# Patient Record
Sex: FEMALE | Race: BLACK OR AFRICAN AMERICAN | URBAN - METROPOLITAN AREA
[De-identification: names, ages, dates, MRNs, and addresses within clinical notes are randomized per-mention and may not be internally consistent; named-entity substitution may affect disease eponyms.]

---

## 2021-03-09 ENCOUNTER — OFFICE VISIT (OUTPATIENT)
Dept: URBAN - METROPOLITAN AREA CLINIC 17 | Facility: CLINIC | Age: 71
End: 2021-03-09
Payer: MEDICARE

## 2021-03-09 DIAGNOSIS — I10 ESSENTIAL HYPERTENSION: ICD-10-CM

## 2021-03-09 DIAGNOSIS — R63.6 UNDERWEIGHT: ICD-10-CM

## 2021-03-09 DIAGNOSIS — K63.5 HYPERPLASTIC COLONIC POLYP, UNSPECIFIED PART OF COLON: ICD-10-CM

## 2021-03-09 DIAGNOSIS — D50.0 IRON DEFICIENCY ANEMIA DUE TO CHRONIC BLOOD LOSS: ICD-10-CM

## 2021-03-09 DIAGNOSIS — E13.9 DIABETES 1.5, MANAGED AS TYPE 2: ICD-10-CM

## 2021-03-09 DIAGNOSIS — N28.9 KIDNEY LESION: ICD-10-CM

## 2021-03-09 DIAGNOSIS — C17.9 ADENOCARCINOMA OF SMALL BOWEL: ICD-10-CM

## 2021-03-09 DIAGNOSIS — B37.89 PERIANAL CANDIDIASIS: ICD-10-CM

## 2021-03-09 PROBLEM — 424440001: Status: ACTIVE | Noted: 2021-03-09

## 2021-03-09 PROBLEM — 426875007: Status: ACTIVE | Noted: 2021-03-09

## 2021-03-09 PROBLEM — 59621000: Status: ACTIVE | Noted: 2021-03-09

## 2021-03-09 PROBLEM — 724556004: Status: ACTIVE | Noted: 2021-03-09

## 2021-03-09 PROCEDURE — 99214 OFFICE O/P EST MOD 30 MIN: CPT | Performed by: INTERNAL MEDICINE

## 2021-03-09 RX ORDER — ATORVASTATIN CALCIUM 40 MG/1
TABLET, FILM COATED ORAL
Qty: 0 | Refills: 0 | Status: ACTIVE | COMMUNITY
Start: 1900-01-01

## 2021-03-09 NOTE — EXAM-PHYSICAL EXAM
Gen: Alert, oriented, in no distress Heent: no icterus, lips wnl Lungs: bilateral breath sounds CVS: first and second heart sounds Abdomen: full, soft, non tender, midline scar with dependant skin Ext: no edema Joints: normal joints and symmetry Spine: consistent with age Skin: no rash on exposed areas Neuro: no focal localizing signs Perianal exam with whitish nummular lesions most c/w candida

## 2021-03-09 NOTE — HPI-OTHER HISTORIES
67 yo lady pt of Stephanie Toledo. She recently had blood work at [facility] on 8/23. It showed Hb 9.8, platelets 937, MCV 88. She says she saw PCP as she has had intermittent abdominal pain. periumbilical . resolving with Tylenol. She then saw DR Lawler with results of her lab work. She did not follow up with me after c-scope as instructed. On 9/14 she then developed severe lower abdominal pain and went to the ER. no vomiting. CT results noted below. Hb was 8.2 MCV 76. She has not lost weight. She eats and has no pain except for nuts and raw veggies which she avoids. WE discussed CT ; possibility of malignancy, liver findings, kidney lesions. Her brother was just diaganosed with bladder. She says "Im not emptying my bladder as I should". She just restarted iron pills and her stools have been black since then . She only has 3 hydrocodone pills from the ER but it controls her pain.  10/9/18 She is feeling better. She has seen Dr Bauer and Dr Lawler. She has also seen Dr Lio Farias for a second opinion. She has a SBFT which showed no obstruction and no fistula with the sigmoid. She has not needed to take a lot of tramadol and still has several pills left. She gets nauseous occasionally and takes zofran for that. she needs some refills. She is taking iron pills. stools are dark. She says at [place] she was told her hb was up to 10. She had a liver bx yesterday. She saw urology Dr Galvan at [facility]. She has had an MRI and we discussed results. She has been having regular BMs. She is seeing a cardiologist at Dr Lawler recommendations for chest pain she had about 6-8 weeks ago lasting 3 hours. 11/20/18 no further chest pain. Had had chemo x 2. No s/e at all she says. She says she is relying on her samanta. She has rescheduled her stress test.She has not had take tramadol. No longer on iron. Got an iron transfusion after first chemo. she has a left chest port.  1/3/19 I spoke to Danilo Lawler and Lizette and have agreed pt should have pan endoscopy to clear her upper and lower GI tracts. She has no complaints except for abd pain when she eats raw veggies and nuts so she avoids these. Otherwise she has had no abd pain and has not taken pain meds in months. She is in excellent spirits, has regular BMs daily. NO nausea or vomiting. Currently her last chemo is for 6 days from now. Reviewed CT reports.  2/7/19 She is doing well no complaints. Spoke to DR Bauer who wanted her to have a pillcam prior to surgery - she will have a segmental resection of her liver and he would run her SB to try and localize the primary lesion. WE discussed risk of pillcam retention. I had her do a SBFT to evaluate SB. it is negative 2/6/19 Will see if her insurance approves it before surgery date and we will do all we can to make this happen. 5/14/19 She is doing well. I reviewed her op notes from 2/2019. She had to be admitted for post op seroma. She had a CT last week which we reviewed - no evidence of metastatic disease. No complaints except for an occasional ache around her scars. She was 85 lbs on discharge and is up to 91 lbs now. She has a BM daily and is walking 3 times a week for 45 mins. 8/15/19 Doing well. no abdominal symptoms. reviewed CT from 8/51/19 .CBC from 8/7/19 is wnl hb 12.2.  1/16/20 doing great. Says she is off a lot of meds reviewed CT from 10/2019 she has seen urology. repeat CT is next week. Has regular BMs. gained 6 lbs.  3/9/21 doing well. has gained 10 lbs. celebrated 35 yrs wedding anniversary yesterday. REviewed CT 1/2021. She has seen urology for renal lesion "they just want to watch it". Has regular BMs. no abdominal pain. Has some rectal itching for the past 6 weeks. no constipation. Spent over 40 mins in care, reviewing records. counseling

## 2021-03-10 ENCOUNTER — TELEPHONE ENCOUNTER (OUTPATIENT)
Dept: URBAN - METROPOLITAN AREA CLINIC 92 | Facility: CLINIC | Age: 71
End: 2021-03-10

## 2021-03-10 RX ORDER — KETOCONAZOLE 20 MG/G
1 APPLICATION CREAM TOPICAL ONCE A DAY
Qty: 1 | Refills: 0 | OUTPATIENT
Start: 2021-03-10 | End: 2021-03-24

## 2024-04-08 ENCOUNTER — LAB (OUTPATIENT)
Dept: URBAN - METROPOLITAN AREA CLINIC 17 | Facility: CLINIC | Age: 74
End: 2024-04-08

## 2024-04-08 ENCOUNTER — OV EP (OUTPATIENT)
Dept: URBAN - METROPOLITAN AREA CLINIC 17 | Facility: CLINIC | Age: 74
End: 2024-04-08

## 2024-04-08 ENCOUNTER — OV NP (OUTPATIENT)
Dept: URBAN - METROPOLITAN AREA CLINIC 17 | Facility: CLINIC | Age: 74
End: 2024-04-08
Payer: MEDICARE

## 2024-04-08 VITALS
WEIGHT: 103.6 LBS | DIASTOLIC BLOOD PRESSURE: 79 MMHG | BODY MASS INDEX: 20.88 KG/M2 | SYSTOLIC BLOOD PRESSURE: 133 MMHG | HEIGHT: 59 IN | HEART RATE: 83 BPM | TEMPERATURE: 97.8 F

## 2024-04-08 DIAGNOSIS — K63.5 HYPERPLASTIC COLONIC POLYP, UNSPECIFIED PART OF COLON: ICD-10-CM

## 2024-04-08 DIAGNOSIS — R63.6 UNDERWEIGHT: ICD-10-CM

## 2024-04-08 DIAGNOSIS — C17.9 ADENOCARCINOMA OF SMALL BOWEL: ICD-10-CM

## 2024-04-08 DIAGNOSIS — I10 ESSENTIAL HYPERTENSION: ICD-10-CM

## 2024-04-08 DIAGNOSIS — D50.0 IRON DEFICIENCY ANEMIA DUE TO CHRONIC BLOOD LOSS: ICD-10-CM

## 2024-04-08 DIAGNOSIS — B37.89 PERIANAL CANDIDIASIS: ICD-10-CM

## 2024-04-08 DIAGNOSIS — E13.9 DIABETES 1.5, MANAGED AS TYPE 2: ICD-10-CM

## 2024-04-08 DIAGNOSIS — N28.9 KIDNEY LESION: ICD-10-CM

## 2024-04-08 PROCEDURE — 99213 OFFICE O/P EST LOW 20 MIN: CPT | Performed by: INTERNAL MEDICINE

## 2024-04-08 RX ORDER — MULTIVIT-MIN/IRON/FOLIC ACID/K 18-600-40
AS DIRECTED CAPSULE ORAL
Status: ACTIVE | COMMUNITY

## 2024-04-08 RX ORDER — B-COMPLEX WITH VITAMIN C
1 TABLET TABLET ORAL ONCE A DAY
Status: ACTIVE | COMMUNITY

## 2024-04-08 RX ORDER — ATORVASTATIN CALCIUM 40 MG/1
TABLET, FILM COATED ORAL
Qty: 0 | Refills: 0 | Status: ON HOLD | COMMUNITY
Start: 1900-01-01

## 2024-04-08 RX ORDER — POLYETHYLENE GLYCOL-3350 AND ELECTROLYTES WITH FLAVOR PACK 240; 5.84; 2.98; 6.72; 22.72 G/278.26G; G/278.26G; G/278.26G; G/278.26G; G/278.26G
AS DIRECTED POWDER, FOR SOLUTION ORAL 1
Qty: 1 | Refills: 0 | OUTPATIENT
Start: 2024-04-08 | End: 2024-04-09

## 2024-04-08 RX ORDER — ATORVASTATIN CALCIUM 40 MG/1
TABLET, FILM COATED ORAL
Qty: 0 | Refills: 0 | COMMUNITY
Start: 1900-01-01

## 2024-04-08 NOTE — HPI-OTHER HISTORIES
67 yo lady pt of Stephanie Toledo. She recently had blood work at [facility] on 8/23. It showed Hb 9.8, platelets 937, MCV 88. She says she saw PCP as she has had intermittent abdominal pain. periumbilical . resolving with Tylenol. She then saw DR Lawler with results of her lab work. She did not follow up with me after c-scope as instructed. On 9/14 she then developed severe lower abdominal pain and went to the ER. no vomiting. CT results noted below. Hb was 8.2 MCV 76. She has not lost weight. She eats and has no pain except for nuts and raw veggies which she avoids. WE discussed CT ; possibility of malignancy, liver findings, kidney lesions. Her brother was just diaganosed with bladder. She says "Im not emptying my bladder as I should". She just restarted iron pills and her stools have been black since then . She only has 3 hydrocodone pills from the ER but it controls her pain.  10/9/18 She is feeling better. She has seen Dr Bauer and Dr Lawler. She has also seen Dr Lio Farias for a second opinion. She has a SBFT which showed no obstruction and no fistula with the sigmoid. She has not needed to take a lot of tramadol and still has several pills left. She gets nauseous occasionally and takes zofran for that. she needs some refills. She is taking iron pills. stools are dark. She says at [place] she was told her hb was up to 10. She had a liver bx yesterday. She saw urology Dr Galvan at [facility]. She has had an MRI and we discussed results. She has been having regular BMs. She is seeing a cardiologist at Dr Lawler recommendations for chest pain she had about 6-8 weeks ago lasting 3 hours. 11/20/18 no further chest pain. Had had chemo x 2. No s/e at all she says. She says she is relying on her samanta. She has rescheduled her stress test.She has not had take tramadol. No longer on iron. Got an iron transfusion after first chemo. she has a left chest port.  1/3/19 I spoke to Danilo Lawler and Lizette and have agreed pt should have pan endoscopy to clear her upper and lower GI tracts. She has no complaints except for abd pain when she eats raw veggies and nuts so she avoids these. Otherwise she has had no abd pain and has not taken pain meds in months. She is in excellent spirits, has regular BMs daily. NO nausea or vomiting. Currently her last chemo is for 6 days from now. Reviewed CT reports.  2/7/19 She is doing well no complaints. Spoke to DR Bauer who wanted her to have a pillcam prior to surgery - she will have a segmental resection of her liver and he would run her SB to try and localize the primary lesion. WE discussed risk of pillcam retention. I had her do a SBFT to evaluate SB. it is negative 2/6/19 Will see if her insurance approves it before surgery date and we will do all we can to make this happen. 5/14/19 She is doing well. I reviewed her op notes from 2/2019. She had to be admitted for post op seroma. She had a CT last week which we reviewed - no evidence of metastatic disease. No complaints except for an occasional ache around her scars. She was 85 lbs on discharge and is up to 91 lbs now. She has a BM daily and is walking 3 times a week for 45 mins. 8/15/19 Doing well. no abdominal symptoms. reviewed CT from 8/51/19 .CBC from 8/7/19 is wnl hb 12.2.  1/16/20 doing great. Says she is off a lot of meds reviewed CT from 10/2019 she has seen urology. repeat CT is next week. Has regular BMs. gained 6 lbs.  3/9/21 doing well. has gained 10 lbs. celebrated 35 yrs wedding anniversary yesterday. REviewed CT 1/2021. She has seen urology for renal lesion "they just want to watch it". Has regular BMs. no abdominal pain. Has some rectal itching for the past 6 weeks. no constipation. Spent over 40 mins in care, reviewing records. counseling  4/8/24 Here for colonoscopy Says she is cancer free "dr Lawler says I am a walking miracle" Has regular BMs no blood in stool.

## 2024-06-05 ENCOUNTER — OUT OF OFFICE VISIT (OUTPATIENT)
Dept: URBAN - METROPOLITAN AREA SURGERY CENTER 13 | Facility: SURGERY CENTER | Age: 74
End: 2024-06-05
Payer: MEDICARE

## 2024-06-05 ENCOUNTER — CLAIMS CREATED FROM THE CLAIM WINDOW (OUTPATIENT)
Dept: URBAN - METROPOLITAN AREA CLINIC 4 | Facility: CLINIC | Age: 74
End: 2024-06-05
Payer: MEDICARE

## 2024-06-05 DIAGNOSIS — K63.5 BENIGN COLON POLYP: ICD-10-CM

## 2024-06-05 DIAGNOSIS — Z86.010 ADENOMAS PERSONAL HISTORY OF COLONIC POLYPS: ICD-10-CM

## 2024-06-05 DIAGNOSIS — K63.89 MELANOSIS OF COLON: ICD-10-CM

## 2024-06-05 DIAGNOSIS — Z09 ENCOUNTER FOR COLONOSCOPY FOLLOWING COLON POLYP REMOVAL: ICD-10-CM

## 2024-06-05 DIAGNOSIS — Z98.0 INTESTINAL BYPASS AND ANASTOMOSIS STATUS: ICD-10-CM

## 2024-06-05 DIAGNOSIS — D12.5 BENIGN NEOPLASM OF SIGMOID COLON: ICD-10-CM

## 2024-06-05 DIAGNOSIS — Z86.010 PERSONAL HISTORY OF COLONIC POLYPS: ICD-10-CM

## 2024-06-05 DIAGNOSIS — K64.8 OTHER HEMORRHOIDS: ICD-10-CM

## 2024-06-05 DIAGNOSIS — Z86.010 PERSONAL HISTORY OF COLON POLYPS: ICD-10-CM

## 2024-06-05 DIAGNOSIS — K63.89 OTHER SPECIFIED DISEASES OF INTESTINE: ICD-10-CM

## 2024-06-05 DIAGNOSIS — D12.5 ADENOMA OF SIGMOID COLON: ICD-10-CM

## 2024-06-05 DIAGNOSIS — K63.5 POLYP OF SIGMOID COLON, UNSPECIFIED TYPE: ICD-10-CM

## 2024-06-05 DIAGNOSIS — K63.5 POLYP OF COLON: ICD-10-CM

## 2024-06-05 PROCEDURE — 00812 ANES LWR INTST SCR COLSC: CPT | Performed by: ANESTHESIOLOGIST ASSISTANT

## 2024-06-05 PROCEDURE — 45380 COLONOSCOPY AND BIOPSY: CPT | Performed by: INTERNAL MEDICINE

## 2024-06-05 PROCEDURE — G8907 PT DOC NO EVENTS ON DISCHARG: HCPCS | Performed by: CLINIC/CENTER

## 2024-06-05 PROCEDURE — 45380 COLONOSCOPY AND BIOPSY: CPT | Performed by: CLINIC/CENTER

## 2024-06-05 PROCEDURE — 88305 TISSUE EXAM BY PATHOLOGIST: CPT | Performed by: PATHOLOGY

## 2024-06-05 PROCEDURE — 00812 ANES LWR INTST SCR COLSC: CPT | Performed by: ANESTHESIOLOGY

## 2024-06-05 RX ORDER — ATORVASTATIN CALCIUM 40 MG/1
TABLET, FILM COATED ORAL
Qty: 0 | Refills: 0 | Status: ON HOLD | COMMUNITY
Start: 1900-01-01

## 2024-06-05 RX ORDER — MULTIVIT-MIN/IRON/FOLIC ACID/K 18-600-40
AS DIRECTED CAPSULE ORAL
Status: ACTIVE | COMMUNITY

## 2024-06-05 RX ORDER — B-COMPLEX WITH VITAMIN C
1 TABLET TABLET ORAL ONCE A DAY
Status: ACTIVE | COMMUNITY

## 2024-08-12 ENCOUNTER — OFFICE VISIT (OUTPATIENT)
Dept: URBAN - METROPOLITAN AREA CLINIC 17 | Facility: CLINIC | Age: 74
End: 2024-08-12
Payer: MEDICARE

## 2024-08-12 ENCOUNTER — DASHBOARD ENCOUNTERS (OUTPATIENT)
Age: 74
End: 2024-08-12

## 2024-08-12 VITALS
HEIGHT: 59 IN | BODY MASS INDEX: 20.52 KG/M2 | WEIGHT: 101.8 LBS | TEMPERATURE: 97.7 F | HEART RATE: 75 BPM | SYSTOLIC BLOOD PRESSURE: 135 MMHG | DIASTOLIC BLOOD PRESSURE: 75 MMHG

## 2024-08-12 DIAGNOSIS — I10 ESSENTIAL HYPERTENSION: ICD-10-CM

## 2024-08-12 DIAGNOSIS — E13.9 DIABETES 1.5, MANAGED AS TYPE 2: ICD-10-CM

## 2024-08-12 DIAGNOSIS — D50.0 IRON DEFICIENCY ANEMIA DUE TO CHRONIC BLOOD LOSS: ICD-10-CM

## 2024-08-12 DIAGNOSIS — K63.5 HYPERPLASTIC COLONIC POLYP, UNSPECIFIED PART OF COLON: ICD-10-CM

## 2024-08-12 PROCEDURE — 99213 OFFICE O/P EST LOW 20 MIN: CPT | Performed by: INTERNAL MEDICINE

## 2024-08-12 RX ORDER — B-COMPLEX WITH VITAMIN C
1 TABLET TABLET ORAL ONCE A DAY
Status: ACTIVE | COMMUNITY

## 2024-08-12 RX ORDER — MULTIVIT-MIN/IRON/FOLIC ACID/K 18-600-40
AS DIRECTED CAPSULE ORAL
Status: ACTIVE | COMMUNITY

## 2024-08-12 NOTE — HPI-OTHER HISTORIES
65 yo lady pt of Stephanie Toledo. She recently had blood work at [facility] on 8/23. It showed Hb 9.8, platelets 937, MCV 88. She says she saw PCP as she has had intermittent abdominal pain. periumbilical . resolving with Tylenol. She then saw DR Lawler with results of her lab work. She did not follow up with me after c-scope as instructed. On 9/14 she then developed severe lower abdominal pain and went to the ER. no vomiting. CT results noted below. Hb was 8.2 MCV 76. She has not lost weight. She eats and has no pain except for nuts and raw veggies which she avoids. WE discussed CT ; possibility of malignancy, liver findings, kidney lesions. Her brother was just diaganosed with bladder. She says "Im not emptying my bladder as I should". She just restarted iron pills and her stools have been black since then . She only has 3 hydrocodone pills from the ER but it controls her pain.  10/9/18 She is feeling better. She has seen Dr Bauer and Dr Lawler. She has also seen Dr Lio Farias for a second opinion. She has a SBFT which showed no obstruction and no fistula with the sigmoid. She has not needed to take a lot of tramadol and still has several pills left. She gets nauseous occasionally and takes zofran for that. she needs some refills. She is taking iron pills. stools are dark. She says at [place] she was told her hb was up to 10. She had a liver bx yesterday. She saw urology Dr Galvan at [facility]. She has had an MRI and we discussed results. She has been having regular BMs. She is seeing a cardiologist at Dr Lawler recommendations for chest pain she had about 6-8 weeks ago lasting 3 hours. 11/20/18 no further chest pain. Had had chemo x 2. No s/e at all she says. She says she is relying on her samanta. She has rescheduled her stress test.She has not had take tramadol. No longer on iron. Got an iron transfusion after first chemo. she has a left chest port.  1/3/19 I spoke to Danilo Lawler and Lizette and have agreed pt should have pan endoscopy to clear her upper and lower GI tracts. She has no complaints except for abd pain when she eats raw veggies and nuts so she avoids these. Otherwise she has had no abd pain and has not taken pain meds in months. She is in excellent spirits, has regular BMs daily. NO nausea or vomiting. Currently her last chemo is for 6 days from now. Reviewed CT reports.  2/7/19 She is doing well no complaints. Spoke to DR Bauer who wanted her to have a pillcam prior to surgery - she will have a segmental resection of her liver and he would run her SB to try and localize the primary lesion. WE discussed risk of pillcam retention. I had her do a SBFT to evaluate SB. it is negative 2/6/19 Will see if her insurance approves it before surgery date and we will do all we can to make this happen. 5/14/19 She is doing well. I reviewed her op notes from 2/2019. She had to be admitted for post op seroma. She had a CT last week which we reviewed - no evidence of metastatic disease. No complaints except for an occasional ache around her scars. She was 85 lbs on discharge and is up to 91 lbs now. She has a BM daily and is walking 3 times a week for 45 mins. 8/15/19 Doing well. no abdominal symptoms. reviewed CT from 8/51/19 .CBC from 8/7/19 is wnl hb 12.2.  1/16/20 doing great. Says she is off a lot of meds reviewed CT from 10/2019 she has seen urology. repeat CT is next week. Has regular BMs. gained 6 lbs.  3/9/21 doing well. has gained 10 lbs. celebrated 35 yrs wedding anniversary yesterday. REviewed CT 1/2021. She has seen urology for renal lesion "they just want to watch it". Has regular BMs. no abdominal pain. Has some rectal itching for the past 6 weeks. no constipation. Spent over 40 mins in care, reviewing records. counseling  4/8/24 Here for colonoscopy Says she is cancer free "dr Lawler says I am a walking miracle" Has regular BMs no blood in stool.  8/12/24 Here for DAB Here with  says only some anal itching. no bleeding. not constipated Discussed colonsocopy and bx resutls.  Answered questions.

## 2024-10-04 ENCOUNTER — LAB OUTSIDE AN ENCOUNTER (OUTPATIENT)
Dept: URBAN - METROPOLITAN AREA CLINIC 105 | Facility: CLINIC | Age: 74
End: 2024-10-04

## 2024-10-04 ENCOUNTER — OFFICE VISIT (OUTPATIENT)
Dept: URBAN - METROPOLITAN AREA CLINIC 105 | Facility: CLINIC | Age: 74
End: 2024-10-04
Payer: MEDICARE

## 2024-10-04 VITALS
SYSTOLIC BLOOD PRESSURE: 128 MMHG | HEART RATE: 66 BPM | WEIGHT: 100.4 LBS | BODY MASS INDEX: 20.24 KG/M2 | HEIGHT: 59 IN | DIASTOLIC BLOOD PRESSURE: 77 MMHG | TEMPERATURE: 97.2 F

## 2024-10-04 DIAGNOSIS — D50.0 IRON DEFICIENCY ANEMIA DUE TO CHRONIC BLOOD LOSS: ICD-10-CM

## 2024-10-04 DIAGNOSIS — K64.9 HEMORRHOIDS, UNSPECIFIED HEMORRHOID TYPE: ICD-10-CM

## 2024-10-04 DIAGNOSIS — N28.9 KIDNEY LESION: ICD-10-CM

## 2024-10-04 DIAGNOSIS — K86.2 PANCREATIC CYST: ICD-10-CM

## 2024-10-04 DIAGNOSIS — E13.9 DIABETES 1.5, MANAGED AS TYPE 2: ICD-10-CM

## 2024-10-04 DIAGNOSIS — I10 ESSENTIAL HYPERTENSION: ICD-10-CM

## 2024-10-04 DIAGNOSIS — K63.5 HYPERPLASTIC COLONIC POLYP, UNSPECIFIED PART OF COLON: ICD-10-CM

## 2024-10-04 DIAGNOSIS — D12.6 SERRATED ADENOMA OF COLON: ICD-10-CM

## 2024-10-04 DIAGNOSIS — B37.89 PERIANAL CANDIDIASIS: ICD-10-CM

## 2024-10-04 DIAGNOSIS — C17.9 ADENOCARCINOMA OF SMALL BOWEL: ICD-10-CM

## 2024-10-04 DIAGNOSIS — Z86.0102 HISTORY OF HYPERPLASTIC POLYP OF COLON: ICD-10-CM

## 2024-10-04 DIAGNOSIS — R63.6 UNDERWEIGHT: ICD-10-CM

## 2024-10-04 PROCEDURE — 99214 OFFICE O/P EST MOD 30 MIN: CPT | Performed by: INTERNAL MEDICINE

## 2024-10-04 RX ORDER — MULTIVIT-MIN/IRON/FOLIC ACID/K 18-600-40
AS DIRECTED CAPSULE ORAL
Status: ACTIVE | COMMUNITY

## 2024-10-04 RX ORDER — B-COMPLEX WITH VITAMIN C
1 TABLET TABLET ORAL ONCE A DAY
Status: ACTIVE | COMMUNITY

## 2024-10-04 NOTE — HPI-OTHER HISTORIES
67 yo lady pt of Stephanie Toledo. She recently had blood work at [facility] on 8/23. It showed Hb 9.8, platelets 937, MCV 88. She says she saw PCP as she has had intermittent abdominal pain. periumbilical . resolving with Tylenol. She then saw DR Lawler with results of her lab work. She did not follow up with me after c-scope as instructed. On 9/14 she then developed severe lower abdominal pain and went to the ER. no vomiting. CT results noted below. Hb was 8.2 MCV 76. She has not lost weight. She eats and has no pain except for nuts and raw veggies which she avoids. WE discussed CT ; possibility of malignancy, liver findings, kidney lesions. Her brother was just diaganosed with bladder. She says "Im not emptying my bladder as I should". She just restarted iron pills and her stools have been black since then . She only has 3 hydrocodone pills from the ER but it controls her pain.  10/9/18 She is feeling better. She has seen Dr Bauer and Dr Lawler. She has also seen Dr Lio Farias for a second opinion. She has a SBFT which showed no obstruction and no fistula with the sigmoid. She has not needed to take a lot of tramadol and still has several pills left. She gets nauseous occasionally and takes zofran for that. she needs some refills. She is taking iron pills. stools are dark. She says at [place] she was told her hb was up to 10. She had a liver bx yesterday. She saw urology Dr Galvan at [facility]. She has had an MRI and we discussed results. She has been having regular BMs. She is seeing a cardiologist at Dr Lawler recommendations for chest pain she had about 6-8 weeks ago lasting 3 hours. 11/20/18 no further chest pain. Had had chemo x 2. No s/e at all she says. She says she is relying on her samanta. She has rescheduled her stress test.She has not had take tramadol. No longer on iron. Got an iron transfusion after first chemo. she has a left chest port.  1/3/19 I spoke to Danilo Lawler and Lizette and have agreed pt should have pan endoscopy to clear her upper and lower GI tracts. She has no complaints except for abd pain when she eats raw veggies and nuts so she avoids these. Otherwise she has had no abd pain and has not taken pain meds in months. She is in excellent spirits, has regular BMs daily. NO nausea or vomiting. Currently her last chemo is for 6 days from now. Reviewed CT reports.  2/7/19 She is doing well no complaints. Spoke to DR Bauer who wanted her to have a pillcam prior to surgery - she will have a segmental resection of her liver and he would run her SB to try and localize the primary lesion. WE discussed risk of pillcam retention. I had her do a SBFT to evaluate SB. it is negative 2/6/19 Will see if her insurance approves it before surgery date and we will do all we can to make this happen. 5/14/19 She is doing well. I reviewed her op notes from 2/2019. She had to be admitted for post op seroma. She had a CT last week which we reviewed - no evidence of metastatic disease. No complaints except for an occasional ache around her scars. She was 85 lbs on discharge and is up to 91 lbs now. She has a BM daily and is walking 3 times a week for 45 mins. 8/15/19 Doing well. no abdominal symptoms. reviewed CT from 8/51/19 .CBC from 8/7/19 is wnl hb 12.2.  1/16/20 doing great. Says she is off a lot of meds reviewed CT from 10/2019 she has seen urology. repeat CT is next week. Has regular BMs. gained 6 lbs.  3/9/21 doing well. has gained 10 lbs. celebrated 35 yrs wedding anniversary yesterday. REviewed CT 1/2021. She has seen urology for renal lesion "they just want to watch it". Has regular BMs. no abdominal pain. Has some rectal itching for the past 6 weeks. no constipation. Spent over 40 mins in care, reviewing records. counseling  4/8/24 Here for colonoscopy Says she is cancer free "dr Lawler says I am a walking miracle" Has regular BMs no blood in stool.  8/12/24 Here for DAB Here with  says only some anal itching. no bleeding. not constipated Discussed colonsocopy and bx resutls.  Answered questions.  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  some stable pulm nodules.

## 2024-10-10 ENCOUNTER — TELEPHONE ENCOUNTER (OUTPATIENT)
Dept: URBAN - METROPOLITAN AREA CLINIC 3 | Facility: CLINIC | Age: 74
End: 2024-10-10

## 2024-10-22 ENCOUNTER — TELEPHONE ENCOUNTER (OUTPATIENT)
Dept: URBAN - METROPOLITAN AREA CLINIC 105 | Facility: CLINIC | Age: 74
End: 2024-10-22

## 2024-10-22 ENCOUNTER — LAB OUTSIDE AN ENCOUNTER (OUTPATIENT)
Dept: URBAN - METROPOLITAN AREA CLINIC 105 | Facility: CLINIC | Age: 74
End: 2024-10-22

## 2024-12-09 ENCOUNTER — OFFICE VISIT (OUTPATIENT)
Dept: URBAN - METROPOLITAN AREA CLINIC 17 | Facility: CLINIC | Age: 74
End: 2024-12-09
Payer: MEDICARE

## 2024-12-09 VITALS
TEMPERATURE: 97.3 F | SYSTOLIC BLOOD PRESSURE: 117 MMHG | DIASTOLIC BLOOD PRESSURE: 75 MMHG | BODY MASS INDEX: 20.48 KG/M2 | WEIGHT: 101.6 LBS | HEART RATE: 78 BPM | HEIGHT: 59 IN

## 2024-12-09 DIAGNOSIS — B37.89 PERIANAL CANDIDIASIS: ICD-10-CM

## 2024-12-09 DIAGNOSIS — R63.6 UNDERWEIGHT: ICD-10-CM

## 2024-12-09 DIAGNOSIS — E13.9 DIABETES 1.5, MANAGED AS TYPE 2: ICD-10-CM

## 2024-12-09 DIAGNOSIS — D50.0 IRON DEFICIENCY ANEMIA DUE TO CHRONIC BLOOD LOSS: ICD-10-CM

## 2024-12-09 DIAGNOSIS — N28.9 KIDNEY LESION: ICD-10-CM

## 2024-12-09 DIAGNOSIS — I10 ESSENTIAL HYPERTENSION: ICD-10-CM

## 2024-12-09 DIAGNOSIS — K64.9 HEMORRHOIDS, UNSPECIFIED HEMORRHOID TYPE: ICD-10-CM

## 2024-12-09 DIAGNOSIS — K63.5 HYPERPLASTIC COLONIC POLYP, UNSPECIFIED PART OF COLON: ICD-10-CM

## 2024-12-09 DIAGNOSIS — D12.6 SERRATED ADENOMA OF COLON: ICD-10-CM

## 2024-12-09 DIAGNOSIS — C17.9 ADENOCARCINOMA OF SMALL BOWEL: ICD-10-CM

## 2024-12-09 DIAGNOSIS — K86.2 PANCREATIC CYST: ICD-10-CM

## 2024-12-09 PROCEDURE — 99214 OFFICE O/P EST MOD 30 MIN: CPT | Performed by: INTERNAL MEDICINE

## 2024-12-09 RX ORDER — B-COMPLEX WITH VITAMIN C
1 TABLET TABLET ORAL ONCE A DAY
Status: ACTIVE | COMMUNITY

## 2024-12-09 RX ORDER — MULTIVIT-MIN/IRON/FOLIC ACID/K 18-600-40
AS DIRECTED CAPSULE ORAL
Status: ACTIVE | COMMUNITY

## 2024-12-09 NOTE — HPI-OTHER HISTORIES
67 yo lady pt of Stephanie Toledo. She recently had blood work at [facility] on 8/23. It showed Hb 9.8, platelets 937, MCV 88. She says she saw PCP as she has had intermittent abdominal pain. periumbilical . resolving with Tylenol. She then saw DR Lawler with results of her lab work. She did not follow up with me after c-scope as instructed. On 9/14 she then developed severe lower abdominal pain and went to the ER. no vomiting. CT results noted below. Hb was 8.2 MCV 76. She has not lost weight. She eats and has no pain except for nuts and raw veggies which she avoids. WE discussed CT ; possibility of malignancy, liver findings, kidney lesions. Her brother was just diaganosed with bladder. She says "Im not emptying my bladder as I should". She just restarted iron pills and her stools have been black since then . She only has 3 hydrocodone pills from the ER but it controls her pain.  10/9/18 She is feeling better. She has seen Dr Bauer and Dr Lawler. She has also seen Dr Lio Farias for a second opinion. She has a SBFT which showed no obstruction and no fistula with the sigmoid. She has not needed to take a lot of tramadol and still has several pills left. She gets nauseous occasionally and takes zofran for that. she needs some refills. She is taking iron pills. stools are dark. She says at [place] she was told her hb was up to 10. She had a liver bx yesterday. She saw urology Dr Galvan at [facility]. She has had an MRI and we discussed results. She has been having regular BMs. She is seeing a cardiologist at Dr Lawler recommendations for chest pain she had about 6-8 weeks ago lasting 3 hours. 11/20/18 no further chest pain. Had had chemo x 2. No s/e at all she says. She says she is relying on her samanta. She has rescheduled her stress test.She has not had take tramadol. No longer on iron. Got an iron transfusion after first chemo. she has a left chest port.  1/3/19 I spoke to Danilo Lawler and Lizette and have agreed pt should have pan endoscopy to clear her upper and lower GI tracts. She has no complaints except for abd pain when she eats raw veggies and nuts so she avoids these. Otherwise she has had no abd pain and has not taken pain meds in months. She is in excellent spirits, has regular BMs daily. NO nausea or vomiting. Currently her last chemo is for 6 days from now. Reviewed CT reports.  2/7/19 She is doing well no complaints. Spoke to DR Bauer who wanted her to have a pillcam prior to surgery - she will have a segmental resection of her liver and he would run her SB to try and localize the primary lesion. WE discussed risk of pillcam retention. I had her do a SBFT to evaluate SB. it is negative 2/6/19 Will see if her insurance approves it before surgery date and we will do all we can to make this happen. 5/14/19 She is doing well. I reviewed her op notes from 2/2019. She had to be admitted for post op seroma. She had a CT last week which we reviewed - no evidence of metastatic disease. No complaints except for an occasional ache around her scars. She was 85 lbs on discharge and is up to 91 lbs now. She has a BM daily and is walking 3 times a week for 45 mins. 8/15/19 Doing well. no abdominal symptoms. reviewed CT from 8/51/19 .CBC from 8/7/19 is wnl hb 12.2.  1/16/20 doing great. Says she is off a lot of meds reviewed CT from 10/2019 she has seen urology. repeat CT is next week. Has regular BMs. gained 6 lbs.  3/9/21 doing well. has gained 10 lbs. celebrated 35 yrs wedding anniversary yesterday. REviewed CT 1/2021. She has seen urology for renal lesion "they just want to watch it". Has regular BMs. no abdominal pain. Has some rectal itching for the past 6 weeks. no constipation. Spent over 40 mins in care, reviewing records. counseling  4/8/24 Here for colonoscopy Says she is cancer free "dr Lawler says I am a walking miracle" Has regular BMs no blood in stool.  8/12/24 Here for DAB Here with  says only some anal itching. no bleeding. not constipated Discussed colonsocopy and bx resutls.  Answered questions.  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  some stable pulm nodules.  12/9/2924 doing well discussed SBFT and MRE - no recurrence Has CT scheduled tomorrow with DR Lawler

## 2025-06-09 ENCOUNTER — OFFICE VISIT (OUTPATIENT)
Dept: URBAN - METROPOLITAN AREA CLINIC 17 | Facility: CLINIC | Age: 75
End: 2025-06-09
Payer: MEDICARE

## 2025-06-09 ENCOUNTER — LAB OUTSIDE AN ENCOUNTER (OUTPATIENT)
Dept: URBAN - METROPOLITAN AREA CLINIC 17 | Facility: CLINIC | Age: 75
End: 2025-06-09

## 2025-06-09 DIAGNOSIS — D12.6 SERRATED ADENOMA OF COLON: ICD-10-CM

## 2025-06-09 DIAGNOSIS — K64.9 HEMORRHOIDS, UNSPECIFIED HEMORRHOID TYPE: ICD-10-CM

## 2025-06-09 DIAGNOSIS — R63.6 UNDERWEIGHT: ICD-10-CM

## 2025-06-09 DIAGNOSIS — N28.9 KIDNEY LESION: ICD-10-CM

## 2025-06-09 DIAGNOSIS — I10 ESSENTIAL HYPERTENSION: ICD-10-CM

## 2025-06-09 DIAGNOSIS — D50.0 IRON DEFICIENCY ANEMIA DUE TO CHRONIC BLOOD LOSS: ICD-10-CM

## 2025-06-09 DIAGNOSIS — K76.89 NODULAR HYPERPLASIA OF LIVER: ICD-10-CM

## 2025-06-09 DIAGNOSIS — K63.5 HYPERPLASTIC COLONIC POLYP, UNSPECIFIED PART OF COLON: ICD-10-CM

## 2025-06-09 DIAGNOSIS — E13.9 DIABETES 1.5, MANAGED AS TYPE 2: ICD-10-CM

## 2025-06-09 DIAGNOSIS — B37.89 PERIANAL CANDIDIASIS: ICD-10-CM

## 2025-06-09 DIAGNOSIS — K86.2 PANCREATIC CYST: ICD-10-CM

## 2025-06-09 DIAGNOSIS — C17.9 ADENOCARCINOMA OF SMALL BOWEL: ICD-10-CM

## 2025-06-09 PROBLEM — 109820009: Status: ACTIVE | Noted: 2025-06-09

## 2025-06-09 PROCEDURE — 99214 OFFICE O/P EST MOD 30 MIN: CPT | Performed by: INTERNAL MEDICINE

## 2025-06-09 RX ORDER — MULTIVIT-MIN/IRON/FOLIC ACID/K 18-600-40
AS DIRECTED CAPSULE ORAL
Status: ACTIVE | COMMUNITY

## 2025-06-09 RX ORDER — B-COMPLEX WITH VITAMIN C
1 TABLET TABLET ORAL ONCE A DAY
Status: ACTIVE | COMMUNITY

## 2025-06-09 NOTE — HPI-OTHER HISTORIES
65 yo lady pt of Stephanie [facility]. She recently had blood work at PCP on 8/23. It showed Hb 9.8, platelets 937, MCV 88. She says she saw PCP as she has had intermittent abdominal pain. periumbilical . resolving with Tylenol. She then saw DR Lawler with results of her lab work. She did not follow up with me after c-scope as instructed. On 9/14 she then developed severe lower abdominal pain and went to the ER. no vomiting. CT results noted below. Hb was 8.2 MCV 76. She has not lost weight. She eats and has no pain except for nuts and raw veggies which she avoids. WE discussed CT ; possibility of malignancy, liver findings, kidney lesions. Her brother was just diaganosed with bladder. She says "Im not emptying my bladder as I should". She just restarted iron pills and her stools have been black since then . She only has 3 hydrocodone pills from the ER but it controls her pain.  10/9/18 She is feeling better. She has seen Dr Bauer and Dr Lawler. She has also seen Dr Lio Farias for a second opinion. She has a SBFT which showed no obstruction and no fistula with the sigmoid. She has not needed to take a lot of tramadol and still has several pills left. She gets nauseous occasionally and takes zofran for that. she needs some refills. She is taking iron pills. stools are dark. She says at [facility] she was told her hb was up to 10. She had a liver bx yesterday. She saw urology Dr Galvan at [facility]. She has had an MRI and we discussed results. She has been having regular BMs. She is seeing a cardiologist at Dr Lawler recommendations for chest pain she had about 6-8 weeks ago lasting 3 hours. 11/20/18 no further chest pain. Had had chemo x 2. No s/e at all she says. She says she is relying on her samanta. She has rescheduled her stress test.She has not had take tramadol. No longer on iron. Got an iron transfusion after first chemo. she has a left chest port.  1/3/19 I spoke to Danilo Lawler and Lizette and have agreed pt should have pan endoscopy to clear her upper and lower GI tracts. She has no complaints except for abd pain when she eats raw veggies and nuts so she avoids these. Otherwise she has had no abd pain and has not taken pain meds in months. She is in excellent spirits, has regular BMs daily. NO nausea or vomiting. Currently her last chemo is for 6 days from now. Reviewed CT reports.  2/7/19 She is doing well no complaints. Spoke to DR Bauer who wanted her to have a pillcam prior to surgery - she will have a segmental resection of her liver and he would run her SB to try and localize the primary lesion. WE discussed risk of pillcam retention. I had her do a SBFT to evaluate SB. it is negative 2/6/19 Will see if her insurance approves it before surgery date and we will do all we can to make this happen. 5/14/19 She is doing well. I reviewed her op notes from 2/2019. She had to be admitted for post op seroma. She had a CT last week which we reviewed - no evidence of metastatic disease. No complaints except for an occasional ache around her scars. She was 85 lbs on discharge and is up to 91 lbs now. She has a BM daily and is walking 3 times a week for 45 mins. 8/15/19 Doing well. no abdominal symptoms. reviewed CT from 8/51/19 .CBC from 8/7/19 is wnl hb 12.2.  1/16/20 doing great. Says she is off a lot of meds reviewed CT from 10/2019 she has seen urology. repeat CT is next week. Has regular BMs. gained 6 lbs.  3/9/21 doing well. has gained 10 lbs. celebrated 35 yrs wedding anniversary yesterday. REviewed CT 1/2021. She has seen urology for renal lesion "they just want to watch it". Has regular BMs. no abdominal pain. Has some rectal itching for the past 6 weeks. no constipation. Spent over 40 mins in care, reviewing records. counseling  4/8/24 Here for colonoscopy Says she is cancer free "dr Lawler says I am a walking miracle" Has regular BMs no blood in stool.  8/12/24 Here for DAB Here with  says only some anal itching. no bleeding. not constipated Discussed colonsocopy and bx resutls.  Answered questions.  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  some stable pulm nodules.  12/9/2024 doing well discussed SBFT and MRE - no recurrence Has CT scheduled tomorrow with DR Lawler  6/9/25 Here for f.u visit Reviewed CT from 12/2024 abd pelvis w contrast. stable. Partial hepatectomy. mild hypertrophy or L lobe. 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 v small periumbilical hernia.  Doing well. Seeing DR Lawler next week. CTs now yearly.

## 2025-06-09 NOTE — EXAM-PHYSICAL EXAM
Gen: Alert, oriented, in no distress Heent: no icterus, lips wnl Lungs: bilateral breath sounds CVS: first and second heart sounds Abdomen: full, soft, non tender, midline scar with dependant skin Ext: no edema Joints: normal joints and symmetry Spine: consistent with age Skin: no rash on exposed areas Neuro: no focal localizing signs

## 2025-06-10 LAB
ALBUMIN/GLOBULIN RATIO: 1.7
ALBUMIN: 4.7
ALKALINE PHOSPHATASE: 110
ALT: 18
AST: 19
BILIRUBIN, DIRECT: 0.1
BILIRUBIN, INDIRECT: 0.5
BILIRUBIN, TOTAL: 0.6
FIB 4 INDEX: 1.02
FIB 4 INTERPRETATION: (no result)
GLOBULIN: 2.8
HBSAG SCREEN: (no result)
HEP A AB, IGM: (no result)
HEP B CORE AB, IGM: (no result)
HEPATITIS C ANTIBODY: (no result)
PLATELET COUNT: 316
PROTEIN, TOTAL: 7.5

## 2025-06-25 ENCOUNTER — OFFICE VISIT (OUTPATIENT)
Dept: URBAN - METROPOLITAN AREA CLINIC 16 | Facility: CLINIC | Age: 75
End: 2025-06-25
Payer: MEDICARE

## 2025-06-25 DIAGNOSIS — K74.60 CIRRHOSIS: ICD-10-CM

## 2025-06-25 DIAGNOSIS — K76.89 NODULAR HYPERPLASIA OF LIVER: ICD-10-CM

## 2025-06-25 PROCEDURE — 76705 ECHO EXAM OF ABDOMEN: CPT | Performed by: INTERNAL MEDICINE

## 2025-06-25 RX ORDER — MULTIVIT-MIN/IRON/FOLIC ACID/K 18-600-40
AS DIRECTED CAPSULE ORAL
Status: ACTIVE | COMMUNITY

## 2025-06-25 RX ORDER — B-COMPLEX WITH VITAMIN C
1 TABLET TABLET ORAL ONCE A DAY
Status: ACTIVE | COMMUNITY

## 2025-06-26 ENCOUNTER — LAB OUTSIDE AN ENCOUNTER (OUTPATIENT)
Dept: URBAN - METROPOLITAN AREA CLINIC 105 | Facility: CLINIC | Age: 75
End: 2025-06-26

## 2025-06-26 ENCOUNTER — TELEPHONE ENCOUNTER (OUTPATIENT)
Dept: URBAN - METROPOLITAN AREA CLINIC 105 | Facility: CLINIC | Age: 75
End: 2025-06-26

## 2025-07-15 ENCOUNTER — TELEPHONE ENCOUNTER (OUTPATIENT)
Dept: URBAN - METROPOLITAN AREA CLINIC 105 | Facility: CLINIC | Age: 75
End: 2025-07-15

## 2025-07-15 ENCOUNTER — OFFICE VISIT (OUTPATIENT)
Dept: URBAN - METROPOLITAN AREA CLINIC 85 | Facility: CLINIC | Age: 75
End: 2025-07-15

## 2025-07-15 RX ORDER — B-COMPLEX WITH VITAMIN C
1 TABLET TABLET ORAL ONCE A DAY
Status: ACTIVE | COMMUNITY

## 2025-07-15 RX ORDER — MULTIVIT-MIN/IRON/FOLIC ACID/K 18-600-40
AS DIRECTED CAPSULE ORAL
Status: ACTIVE | COMMUNITY

## 2025-07-17 ENCOUNTER — LAB OUTSIDE AN ENCOUNTER (OUTPATIENT)
Dept: URBAN - METROPOLITAN AREA CLINIC 105 | Facility: CLINIC | Age: 75
End: 2025-07-17